# Patient Record
Sex: MALE | Race: WHITE | NOT HISPANIC OR LATINO | URBAN - METROPOLITAN AREA
[De-identification: names, ages, dates, MRNs, and addresses within clinical notes are randomized per-mention and may not be internally consistent; named-entity substitution may affect disease eponyms.]

---

## 2023-03-21 ENCOUNTER — OFFICE (OUTPATIENT)
Dept: URBAN - METROPOLITAN AREA CLINIC 14 | Facility: CLINIC | Age: 73
End: 2023-03-21

## 2023-03-21 VITALS
WEIGHT: 160 LBS | DIASTOLIC BLOOD PRESSURE: 62 MMHG | OXYGEN SATURATION: 96 % | HEIGHT: 69 IN | HEART RATE: 72 BPM | SYSTOLIC BLOOD PRESSURE: 125 MMHG

## 2023-03-21 DIAGNOSIS — R63.4 ABNORMAL WEIGHT LOSS: ICD-10-CM

## 2023-03-21 DIAGNOSIS — R68.81 EARLY SATIETY: ICD-10-CM

## 2023-03-21 PROCEDURE — 99214 OFFICE O/P EST MOD 30 MIN: CPT | Performed by: INTERNAL MEDICINE

## 2023-03-21 RX ORDER — SODIUM PICOSULFATE, MAGNESIUM OXIDE, AND ANHYDROUS CITRIC ACID 10; 3.5; 12 MG/160ML; G/160ML; G/160ML
LIQUID ORAL
Qty: 320 | Refills: 0 | Status: ACTIVE
Start: 2023-03-21

## 2025-05-05 ENCOUNTER — OFFICE (OUTPATIENT)
Dept: URBAN - METROPOLITAN AREA CLINIC 11 | Facility: CLINIC | Age: 75
End: 2025-05-05
Payer: MEDICARE

## 2025-05-05 VITALS
OXYGEN SATURATION: 97 % | WEIGHT: 164 LBS | HEIGHT: 69 IN | HEART RATE: 75 BPM | SYSTOLIC BLOOD PRESSURE: 148 MMHG | DIASTOLIC BLOOD PRESSURE: 91 MMHG

## 2025-05-05 DIAGNOSIS — Z86.0100 PERSONAL HISTORY OF COLON POLYPS, UNSPECIFIED: ICD-10-CM

## 2025-05-05 DIAGNOSIS — K59.00 CONSTIPATION, UNSPECIFIED: ICD-10-CM

## 2025-05-05 PROCEDURE — 99214 OFFICE O/P EST MOD 30 MIN: CPT | Performed by: NURSE PRACTITIONER

## 2025-05-05 RX ORDER — LINACLOTIDE 290 UG/1
CAPSULE, GELATIN COATED ORAL
Qty: 30 | Refills: 6 | Status: ACTIVE
Start: 2025-05-05

## 2025-05-05 RX ORDER — POLYETHYLENE GLYCOL 3350, SODIUM SULFATE ANHYDROUS, SODIUM BICARBONATE, SODIUM CHLORIDE, POTASSIUM CHLORIDE 236; 22.74; 6.74; 5.86; 2.97 G/4L; G/4L; G/4L; G/4L; G/4L
POWDER, FOR SOLUTION ORAL
Qty: 4000 | Refills: 0 | Status: ACTIVE
Start: 2025-05-05

## 2025-05-05 NOTE — SERVICEHPINOTES
Mr. Sun is a  74-year-old male here today with complaints of constipation.  He has seen Dr. Noble on 10 March 2023 for   a 50-60 weight loss.  A DB was scheduled but never done.   His last colonoscopy was in 2008 and he had a tubular adenoma.   He has gained 4 lb since his last visit 2 years ago.  he has dementia and his wife is with him today.   He has had issues with constipation his whole life.  He had some sort of surgery when he was a baby to make it easier for him to pass a bowel movement.   his colonoscopy has worsened over the past 6 weeks.  His wife states he is not drinking as much water but his appetite is okay.  He has not had a bowel movement in 2 weeks but denies any nausea, vomiting, abdominal pain, bloating.  He is passing gas. His wife has given him 3 enemas, Ex-Lax, MiraLax, stool softeners.  he has had a fecal impaction 2 years ago.  He denies any blood in stool.

## 2025-05-05 NOTE — SERVICENOTES
Will get him scheduled for colonoscopy.  Discussed risk of colonoscopy such as but not limited to perforation, infection, bleeding.  He verbalizes understanding agrees to proceed with procedure.   He does have dementia and his wife is with him today.    will get an abdominal x-ray and try him on Linzess [address].  will see back in 2 months or sooner if needed